# Patient Record
(demographics unavailable — no encounter records)

---

## 2025-07-02 NOTE — HISTORY OF PRESENT ILLNESS
[de-identified] : BW was  6lbs 11oz. No issues with the pregnancy or delivery.  Mom recalls the baby passed meconium on the first day of life. Getting formula in hospital but then mom was pumping, he was always having blotching skin, there were episodes. Then saw Dr. Vargas, no visible blood, 2.5 mo guaiac pos. Stooling 2x per day or may be a little more. Then started on gentlease and projectile vomited, then neocate, he was on neocate until he was 1.  As he got older he was better and was stooling 2x per day. When he was starting to eat food and he would get hives with various food. Saw allergy and + for dairy on skin test. Avoiding dairy and will get blotches or hives, baked milk challeange and passed. Goes to . There was norovirus going around, tested neg.  April he started having diarrhea, mucousy and stringy. then pure liquid.  Vomiting is only while sleeping. April 30, Vomiting only in the middle of th enight, would vomiting once, this weekend was continuous vomiting only at night, 11-3 am.  Then during the day there has not been vomiting.  Vomiting stoped on abx but then restarted. When he was on vanco the vomiting stopped. The stool gets thicker. Stooling 2x per day,    Mom has been giving liquid zofran. Allergy did not see anyting recently.    He was eating well after the hospital, weight gain has been poor, no weight gain for the last 6 mo. Apple juice mixed with water and OJ.  Started florastor.  He is taking lansoprazole.